# Patient Record
Sex: FEMALE | Race: OTHER | URBAN - METROPOLITAN AREA
[De-identification: names, ages, dates, MRNs, and addresses within clinical notes are randomized per-mention and may not be internally consistent; named-entity substitution may affect disease eponyms.]

---

## 2018-05-07 ENCOUNTER — HOSPITAL ENCOUNTER (OUTPATIENT)
Dept: LAB | Age: 28
Discharge: HOME | End: 2018-05-07

## 2018-05-07 ENCOUNTER — OFFICE VISIT (OUTPATIENT)
Dept: FAMILY MEDICINE CLINIC | Age: 28
End: 2018-05-07

## 2018-05-07 VITALS
HEIGHT: 60 IN | WEIGHT: 121 LBS | SYSTOLIC BLOOD PRESSURE: 105 MMHG | BODY MASS INDEX: 23.75 KG/M2 | TEMPERATURE: 97.7 F | HEART RATE: 73 BPM | DIASTOLIC BLOOD PRESSURE: 45 MMHG

## 2018-05-07 DIAGNOSIS — N30.90 CYSTITIS: ICD-10-CM

## 2018-05-07 DIAGNOSIS — Z13.9 ENCOUNTER FOR SCREENING: ICD-10-CM

## 2018-05-07 DIAGNOSIS — N30.90 CYSTITIS: Primary | ICD-10-CM

## 2018-05-07 LAB
BILIRUB UR QL STRIP: NORMAL
GLUCOSE POC: NORMAL MG/DL
GLUCOSE UR-MCNC: NEGATIVE MG/DL
HCG URINE, QL. (POC): NEGATIVE
HGB BLD-MCNC: 12.3 G/DL
KETONES P FAST UR STRIP-MCNC: NORMAL MG/DL
PH UR STRIP: 5 [PH] (ref 4.6–8)
PROT UR QL STRIP: NORMAL
SP GR UR STRIP: 1.02 (ref 1–1.03)
UA UROBILINOGEN AMB POC: NORMAL (ref 0.2–1)
URINALYSIS CLARITY POC: NORMAL
URINALYSIS COLOR POC: NORMAL
URINE BLOOD POC: NORMAL
URINE LEUKOCYTES POC: NORMAL
URINE NITRITES POC: NEGATIVE
VALID INTERNAL CONTROL?: YES

## 2018-05-07 PROCEDURE — 87491 CHLMYD TRACH DNA AMP PROBE: CPT | Performed by: NURSE PRACTITIONER

## 2018-05-07 PROCEDURE — 87147 CULTURE TYPE IMMUNOLOGIC: CPT | Performed by: NURSE PRACTITIONER

## 2018-05-07 PROCEDURE — 87086 URINE CULTURE/COLONY COUNT: CPT | Performed by: NURSE PRACTITIONER

## 2018-05-07 RX ORDER — CEPHALEXIN 500 MG/1
500 CAPSULE ORAL 2 TIMES DAILY
Qty: 14 CAP | Refills: 0 | Status: SHIPPED | OUTPATIENT
Start: 2018-05-07 | End: 2018-05-14

## 2018-05-07 NOTE — PROGRESS NOTES
Subjective:     Chief Complaint   Patient presents with    Urinary Burning     x4 months +back pain        She  is a 25 y.o. female who presents for evaluation of dysuria. Onset approx 3-4 days ago. S&S is constant, noting burning occurs post urination w/ scant. Notes some L sided flank pain, darker yellow urine w/ a mild malodor. No attempted remedies, except OTC analgesics (APAP). PMH: denies     Surg:   x 2011. NKDA     Current Rx/supplements: denies,OTC as noted above. Social:  Pt denies tobacco, etoh nor drug use. Pt is not currently working. Son was born in . Pt lives w/ her partner, dtr and son. Pt is breastfeeding son. Family Hx: Denies                    ROS  Gen - no fever/chills  Resp - no dyspnea or cough  CV - no chest pain or MCDONOUGH  Rest per HPI    No past medical history on file. No past surgical history on file. No current outpatient prescriptions on file prior to visit. No current facility-administered medications on file prior to visit.          Objective:     Vitals:    18 0923   BP: 105/45   Pulse: 73   Temp: 97.7 °F (36.5 °C)   TempSrc: Oral   Weight: 121 lb (54.9 kg)   Height: 5' 0.43\" (1.535 m)       Physical Examination:  General appearance - alert, well appearing, and in no distress  Eyes -sclera anicteric  Neck - supple, no significant adenopathy, no thyromegaly  Chest - clear to auscultation, no wheezes, rales or rhonchi, symmetric air entry  Heart - normal rate, regular rhythm, normal S1, S2, no murmurs, rubs, clicks or gallops  Neurological - alert, oriented, no focal findings or movement disorder noted  Abdomen-BS present/WNL x 4 quads, non-tender/distended, soft,no organomegaly    Recent Results (from the past 12 hour(s))   AMB POC GLUCOSE BLOOD, BY GLUCOSE MONITORING DEVICE    Collection Time: 18  9:31 AM   Result Value Ref Range    Glucose POC nf 105 mg/dL   AMB POC HEMOGLOBIN (HGB)    Collection Time: 18 9:31 AM   Result Value Ref Range    Hemoglobin (POC) 12.3    AMB POC URINALYSIS DIP STICK MANUAL W/O MICRO    Collection Time: 05/07/18  9:38 AM   Result Value Ref Range    Color (UA POC) Dark Yellow     Clarity (UA POC) Turbid     Glucose (UA POC) Negative Negative    Bilirubin (UA POC) 1+ Negative    Ketones (UA POC) Trace Negative    Specific gravity (UA POC) 1.020 1.001 - 1.035    Blood (UA POC) 3+ Negative    pH (UA POC) 5.0 4.6 - 8.0    Protein (UA POC) 1+ Negative    Urobilinogen (UA POC) 0.2 mg/dL 0.2 - 1    Nitrites (UA POC) Negative Negative    Leukocyte esterase (UA POC) 2+ Negative   AMB POC URINE PREGNANCY TEST, VISUAL COLOR COMPARISON    Collection Time: 05/07/18  9:39 AM   Result Value Ref Range    VALID INTERNAL CONTROL POC Yes     HCG urine, Ql. (POC) Negative Negative         Assessment/ Plan:   Diagnoses and all orders for this visit:    1. Cystitis  -     CULTURE, URINE; Future  -     CHLAMYDIA/GC PCR; Future  -     cephALEXin (KEFLEX) 500 mg capsule; Take 1 Cap by mouth two (2) times a day for 7 days. [place] 1 capsula dos veces al zee por 7 dan. 2. Encounter for screening  -     AMB POC GLUCOSE BLOOD, BY GLUCOSE MONITORING DEVICE  -     AMB POC HEMOGLOBIN (HGB)  -     AMB POC URINE PREGNANCY TEST, VISUAL COLOR COMPARISON  -     AMB POC URINALYSIS DIP STICK MANUAL W/O MICRO        Discussed option of waiting for Cx report vs pre-emptive Tx, Pt opted for Tx given discomfort. Confirmed w/ Pt Abx was well tolerated r/t breastfeeding, and that monitoring her son for allergic S&S/diarrhea was needed. Check urine Cx and g/c. Change Abx PRN based on urine C&S. Cont OTC APAP for discomfort. I have discussed the diagnosis with the patient and the intended plan as seen in the above orders. The patient has received an after-visit summary and questions were answered concerning future plans. I have discussed medication side effects and warnings with the patient as well.   The patient verbalizes understanding and agreement with the plan.     Follow-up Disposition: Not on File

## 2018-05-07 NOTE — MR AVS SNAPSHOT
[address] [place] [address] [address] Nicole Singh 13 
[phone] Patient: Kai Braden MRN: [id number] :10/23/1999 Visit Information Miley Donato Personal Médico Departamento Teléfono del Dep. Número de visita 2018  9:45 AM Kerri Back NP [facility] [address] [facility] [phone] Follow-up Instructions Return if symptoms worsen or fail to improve. Upcoming Health Maintenance Date Due Hepatitis B Peds Age 0-18 (1 of 3 - Primary Series) 10/23/1999 Hepatitis A Peds Age 1-18 (1 of 2 - Standard Series) 10/23/2000 MMR Peds Age 1-18 (1 of 2) 10/23/2000 DTaP/Tdap/Td series (1 - Tdap) 10/23/2006 HPV Age 9Y-34Y (1 of 3 - Female 3 Dose Series) 10/23/2010 Varicella Peds Age 1-18 (1 of 2 - 2 Dose Adolescent Series) 10/23/2012 MCV through Age 25 (1 of 1) 10/23/2015 Influenza Age 5 to Adult 2018 Alergias  Review Complete El: 2018 Por: Kerri Back NP  
 Rik Ra del:  2018 No Known Allergies Vacunas actuales Arland Jobs No hay ninguna vacuna archivada. No revisadas esta visita You Were Diagnosed With   
  
 [place] Perish Cystitis    -  Primary ICD-10-CM: N30.90 ICD-9-CM: 595.9 Encounter for screening     ICD-10-CM: Z13.9 ICD-9-CM: V82.9 Partes vitales PS Pulso Temperatura [place] ( percentil de crecimiento) Peso (percentil de crecimiento) 105/45 (38 %/ 4 %)* (BP 1 Location: Right arm) 73 97.7 °F (36.5 °C) (Oral) 5' 0.43\" (1.535 m) (7 %, Z= -1.50) 121 lb (54.9 kg) (41 %, Z= -0.22) LMP (última parvez) BMI ([facility]) Estado obstétrico Estatus de tabaquísmo 2018 23.29 kg/m2 (70 %, Z= 0.51) Injection Never Smoker *BP percentiles are based on NHBPEP's 4th Report Growth percentiles are based on CDC 2-20 Years data. Historial de signos vitales BMI and BSA Data Body Mass Index Body Surface Area  
 23.29 kg/m 2 1.53 m 2 [facility] Name Phone [address] [phone] Garsia lista de medicamentos actualizada Lista actualizada 5/7/18 11:03 AM.  Yesi Lied use garsia lista de medicamentos más reciente. cephALEXin 500 mg capsule También conocido renetta:  Geri Prima Take 1 Cap by mouth two (2) times a day for 7 days. [place] 1 capsula dos veces al zee por 7 dan. Recetas Enviado a la [place] Refills  
 cephALEXin (KEFLEX) 500 mg capsule 0 Sig: Take 1 Cap by mouth two (2) times a day for 7 days. [place] 1 capsula dos veces al zee por 7 dan. Class: Normal  
 Pharmacy: [facility] DR KATTY MCKEON [facility] 36, [address] #: [phone] Route: Oral  
  
Hicimos lo siguiente AMB POC GLUCOSE BLOOD, BY GLUCOSE MONITORING DEVICE [64916 CPT(R)] AMB POC HEMOGLOBIN (HGB) [14947 CPT(R)] AMB POC URINALYSIS DIP STICK MANUAL W/O MICRO [85858 CPT(R)] AMB POC URINE PREGNANCY TEST, VISUAL COLOR COMPARISON [50570 CPT(R)] Instrucciones de seguimiento Return if symptoms worsen or fail to improve. Por hacer 05/07/2018 Lab:  CHLAMYDIA/GC PCR   
  
 05/07/2018 Microbiology:  CULTURE, URINE Instrucciones para el Paciente Infección urinaria en las mujeres: Instrucciones de cuidado - [ Urinary Tract Infection in Women: Care Instructions ] Instrucciones de cuidado Cesar infección urinaria (UTI, por roxane siglas en inglés) es un término general que hace referencia a cesar infección que se produce en cualquier parte entre los riñones y la uretra (conducto por el cual se expulsa la orina). La mayoría de las UTI son infecciones de la vejiga. Con frecuencia, causan dolor o ardor al [facility]. Las UTI son causadas por bacterias y pueden curarse con antibióticos. Asegúrese de completar el tratamiento para que la infección desaparezca. La atención de seguimiento es cesar parte clave de serra tratamiento y seguridad. Asegúrese de hacer y acudir a todas las citas, y llame a serra médico si está teniendo problemas. También es cesar buena idea saber los resultados de los exámenes y mantener cesar lista de los medicamentos que jesus. Cómo puede cuidarse en el hogar? · [address] No deje de tomarlos por el hecho de sentirse mejor. Debe aiden todos los antibióticos hasta terminarlos. · Yasmeen los próximos sandy o [address], summer mayor cantidad de [place] y otros líquidos. [place] puede ayudar a eliminar las bacterias que provocan la infección. (Si tiene cesar enfermedad de los riñones, el corazón o el hígado y tiene que [place]'s líquidos, hable con serra médico antes de aumentar serra consumo). · Evite las bebidas gaseosas o con cafeína. Pueden irritar la vejiga. · Orine con frecuencia. Trate de vaciar la vejiga cada vez que orine. · Para aliviar el dolor, tome un baño caliente o colóquese cesar almohadilla térmica a baja temperatura sobre la parte baja del abdomen o la jan genital. Nunca se duerma mientras [place] cesar almohadilla térmica. [address] infecciones urinarias · Summer abundante agua todos los días. [place] la ayuda a orinar con frecuencia, lo que elimina las bacterias de serra organismo. (Si tiene cesar enfermedad de los riñones, el corazón o el hígado y tiene que Aminah's líquidos, hable con serra médico antes de aumentar serra consumo). · Orine cuando necesite hacerlo. · Orine inmediatamente después de althea tenido [employer]. · Cámbiese las toallas sanitarias con frecuencia. · Evite el uso de lavados vaginales, los abiodun de burbujas, los [place]ter[place] de higiene femenina y otros productos para la higiene femenina que contengan desodorantes. · Después de ir al baño, límpiese de adelante hacia atrás. Cuándo debes pedir ayuda? Llama a tu médico ahora mismo o busca atención médica inmediata si: 
? · Aparecen síntomas renetta fiebre, escalofríos, náuseas o vómitos por Ck Proffer, o empeoran. ? · Te empieza a doler la espalda, samnia debajo de la caja torácica. A esto se le llama dolor en el flanco.  
? · Aparece kolby o pus en la orina. ? · Tienes problemas con los antibióticos. ?Presta especial atención a los cambios en tu steph y asegúrate de comunicarte con tu médico si: 
? · No mejoras después de althea tomado un antibiótico tony 2 días. ? · Los síntomas desaparecen y Della Blair. Dónde puede encontrar más información en inglés? Melissa Marcoa a [email or web address]-sharda.[email or web address] Becca Pleitez B605 en la búsqueda para aprender más acerca de \"Infección urinaria en las mujeres: Instrucciones de cuidado - [ Urinary Tract Infection in Women: Care Instructions ]. \" 
Revisado: 12 duncan, 2017 Versión del contenido: 11.4 © 6861-9234 Healthwise, Incorporated. Las instrucciones de cuidado fueron adaptadas bajo licencia por [employer] (which disclaims liability or warranty for this information). Si usted tiene [place] afección médica o sobre estas instrucciones, siempre pregunte a serra profesional de steph. Healthwise, Incorporated niega toda garantía o responsabilidad por serra uso de esta información. Introducing [facility]! [facility] introduce portal paciente Georginahart . Ahora se puede acceder a partes de serra expediente médico, enviar por correo electrónico la oficina de serra médico y solicitar renovaciones de medicamentos en línea. En serra navegador de Internet , Ariel Beards a [email or web address] Khang clic en el usuario por Ck Conklin? Leveda Juanito clic aquí en la sesión Deborah Corrigan. Verá la página de registro [place]. Ingrese serra código de [facility] jaky y renetta aparece a continuación.  Usted no tendrá que [employer] código después de althea completado el proceso de registro . Si usted no se inscribe antes de la fecha de caducidad , debe solicitar un nuevo código. · MyChart Código de acceso : [id number] Expires: 8/5/2018 11:02 AM 
 
Ingresa los últimos cuatro dígitos de serra Número de Seguro Social ( xxxx ) y fecha de nacimiento ( dd / mm / aaaa ) renetta se indica y khang clic en Enviar. Usted será llevado a la siguiente página de registro . Crear un ID MyChart . Esta será serra ID de inicio de sesión de MyChart y no puede ser [place] , por lo que pensar en cesar que es Madonna Arsh y fácil de recordar . Crear cesar contraseña MyChart . Usted puede cambiar serra contraseña en cualquier momento . Ingrese serra Password Reset de preguntas y [place] . [place] se puede utilizar en un momento posterior si usted olvida serra contraseña. Introduzca serra dirección de correo electrónico . Roger Commander recibirá cesar notificación por correo electrónico cuando la nueva información está disponible en MyChart . Lucrezia Lager clic en Registrarse. Hiral [place] candelario y descargar porciones de serra expediente médico. 
Khang clic en el enlace de descarga del menú Resumen para descargar cesar copia portátil de serra información médica . Si tiene [employer] , por favor visite la sección de preguntas frecuentes del sitio web MyChart . Recuerde, MyChart NO es que se utilizará para las necesidades urgentes. Para emergencias médicas , llame al 911 . Ahora disponible en serra iPhone y Android ! Por favor proporcione tonia resumen de la documentación de cuidado a serra próximo proveedor. If you have any questions after today's visit, please call [phone].

## 2018-05-07 NOTE — PATIENT INSTRUCTIONS
Infección urinaria en las mujeres: Instrucciones de cuidado - [ Urinary Tract Infection in Women: Care Instructions ]  Instrucciones de cuidado    Cesar infección urinaria (UTI, por roxane siglas en inglés) es un término general que hace referencia a cesar infección que se produce en cualquier parte entre los riñones y la uretra (conducto por el cual se expulsa la orina). La mayoría de las UTI son infecciones de la vejiga. Con frecuencia, causan dolor o ardor al [place]ty. Las UTI son causadas por bacterias y pueden curarse con antibióticos. Asegúrese de completar el tratamiento para que la infección desaparezca. La atención de seguimiento es cesar parte clave de serra tratamiento y seguridad. Asegúrese de hacer y acudir a todas las citas, y llame a serra médico si está teniendo problemas. También es cesar buena idea saber los resultados de los exámenes y mantener cesar lista de los medicamentos que jesus. ¿Cómo puede cuidarse en el hogar? · [address]. No deje de tomarlos por el hecho de sentirse mejor. Debe aiden todos los antibióticos hasta terminarlos. · Yasme[place] los próximos sandy o [address], summer mayor cantidad de [place] y otros líquidos. [place] puede ayudar a eliminar las bacterias que provocan la infección. (Si tiene cesar enfermedad de los riñones, el corazón o el hígado y tiene que [place]'s líquidos, hable con serra médico antes de aumentar serra consumo). · Evite las bebidas gaseosas o con cafeína. Pueden irritar la vejiga. · Orine con frecuencia. Trate de vaciar la vejiga cada vez que orine. · Para aliviar el dolor, tome un baño caliente o colóquese cesar almohadilla térmica a baja temperatura sobre la parte baja del abdomen o la jan genital. Nunca se duerma mientras [place] cesar almohadilla térmica. Para prevenir las infecciones urinarias  · Summer abundante agua todos los días. [place] la ayuda a orinar con frecuencia, lo que elimina las bacterias de serra organismo.  (Si tiene [employer] riñones, el corazón o el hígado y tiene que Aminah's líquidos, hable con serra médico antes de aumentar serra consumo). · Orine cuando necesite hacerlo. · Orine inmediatamente después de althea tenido [employer]. · Cámbiese las toallas sanitarias con frecuencia. · Evite el uso de lavados vaginales, los abiodun de burbujas, los [place]ter[place] de higiene femenina y otros productos para la higiene femenina que contengan desodorantes. · Después de ir al baño, límpiese de adelante hacia atrás. ¿Cuándo debes pedir ayuda? Llama a tu médico ahora mismo o busca atención médica inmediata si:  ? · Aparecen síntomas renetta fiebre, escalofríos, náuseas o vómitos por [place], o empeoran. ? · Te empieza a doler la espalda, samina debajo de la caja torácica. A esto se le llama dolor en el flanco.   ? · Aparece kolby o pus en la orina. ? · Tienes problemas con los antibióticos. ?Presta especial atención a los cambios en tu steph y asegúrate de comunicarte con tu médico si:  ? · No mejoras después de althea tomado un antibiótico tony 2 días. ? · Los síntomas desaparecen y [place]. ¿Dónde puede encontrar más información en inglés? Bruno Sinks a http://kayce-sharda.info/. Amna Montalvo K966 en la búsqueda para aprender más acerca de \"Infección urinaria en las mujeres: Instrucciones de cuidado - [ Urinary Tract Infection in Women: Care Instructions ]. \"  Revisado: 12 [place]sgood, 2017  Versión del contenido: 11.4  © 7809-8350 Healthwise, Incorporated. Las instrucciones de cuidado fueron adaptadas bajo licencia por [employer] Help Connections (which disclaims liability or warranty for this information). Si usted tiene [place] afección médica o sobre estas instrucciones, siempre pregunte a serra profesional de steph. [facility], Incorporated niega toda garantía o responsabilidad por serra uso de esta información.

## 2018-05-07 NOTE — PROGRESS NOTES
AVS printed and reviewed. E script discussed. No Good Rx is required as cost for 14 tabs of Keflex is approx $4.   Discussion assisted by [facility] [place] [place] , Devin Guajardo.

## 2018-05-08 LAB
BACTERIA SPEC CULT: ABNORMAL
BACTERIA SPEC CULT: ABNORMAL
CC UR VC: ABNORMAL
SERVICE CMNT-IMP: ABNORMAL

## 2018-05-09 LAB
C TRACH DNA SPEC QL NAA+PROBE: NEGATIVE
N GONORRHOEA DNA SPEC QL NAA+PROBE: NEGATIVE
SAMPLE TYPE: NORMAL
SERVICE CMNT-IMP: NORMAL
SPECIMEN SOURCE: NORMAL

## 2022-07-21 ENCOUNTER — HOSPITAL ENCOUNTER (OUTPATIENT)
Dept: LAB | Age: 32
Discharge: HOME | End: 2022-07-21

## 2022-07-21 ENCOUNTER — OFFICE VISIT (OUTPATIENT)
Dept: FAMILY MEDICINE CLINIC | Age: 32
End: 2022-07-21

## 2022-07-21 VITALS
OXYGEN SATURATION: 95 % | HEIGHT: 61 IN | HEART RATE: 66 BPM | SYSTOLIC BLOOD PRESSURE: 113 MMHG | DIASTOLIC BLOOD PRESSURE: 66 MMHG | BODY MASS INDEX: 27.56 KG/M2 | WEIGHT: 146 LBS | TEMPERATURE: 98.1 F

## 2022-07-21 DIAGNOSIS — M54.2 NECK PAIN: ICD-10-CM

## 2022-07-21 DIAGNOSIS — R22.1 NECK SWELLING: ICD-10-CM

## 2022-07-21 DIAGNOSIS — R22.1 NECK SWELLING: Primary | ICD-10-CM

## 2022-07-21 LAB
ALBUMIN SERPL-MCNC: 4.1 G/DL (ref 3.5–5)
ALBUMIN/GLOB SERPL: 1.2 {RATIO} (ref 1.1–2.2)
ALP SERPL-CCNC: 74 U/L (ref 45–117)
ALT SERPL-CCNC: 18 U/L (ref 12–78)
ANION GAP SERPL CALC-SCNC: 7 MMOL/L (ref 5–15)
AST SERPL-CCNC: 12 U/L (ref 15–37)
BILIRUB SERPL-MCNC: 0.3 MG/DL (ref 0.2–1)
BUN SERPL-MCNC: 8 MG/DL (ref 6–20)
BUN/CREAT SERPL: 13 (ref 12–20)
CALCIUM SERPL-MCNC: 9 MG/DL (ref 8.5–10.1)
CHLORIDE SERPL-SCNC: 108 MMOL/L (ref 97–108)
CO2 SERPL-SCNC: 24 MMOL/L (ref 21–32)
CREAT SERPL-MCNC: 0.64 MG/DL (ref 0.55–1.02)
ERYTHROCYTE [DISTWIDTH] IN BLOOD BY AUTOMATED COUNT: 13.9 % (ref 11.5–14.5)
GLOBULIN SER CALC-MCNC: 3.3 G/DL (ref 2–4)
GLUCOSE SERPL-MCNC: 90 MG/DL (ref 65–100)
HCT VFR BLD AUTO: 41.9 % (ref 35–47)
HGB BLD-MCNC: 13.5 G/DL (ref 11.5–16)
MCH RBC QN AUTO: 29.9 PG (ref 26–34)
MCHC RBC AUTO-ENTMCNC: 32.2 G/DL (ref 30–36.5)
MCV RBC AUTO: 92.9 FL (ref 80–99)
NRBC # BLD: 0 K/UL (ref 0–0.01)
NRBC BLD-RTO: 0 PER 100 WBC
PLATELET # BLD AUTO: 287 K/UL (ref 150–400)
PMV BLD AUTO: 10.9 FL (ref 8.9–12.9)
POTASSIUM SERPL-SCNC: 3.8 MMOL/L (ref 3.5–5.1)
PROT SERPL-MCNC: 7.4 G/DL (ref 6.4–8.2)
RBC # BLD AUTO: 4.51 M/UL (ref 3.8–5.2)
SODIUM SERPL-SCNC: 139 MMOL/L (ref 136–145)
TSH SERPL DL<=0.05 MIU/L-ACNC: 0.66 UIU/ML (ref 0.36–3.74)
WBC # BLD AUTO: 8.7 K/UL (ref 3.6–11)

## 2022-07-21 PROCEDURE — 84443 ASSAY THYROID STIM HORMONE: CPT

## 2022-07-21 PROCEDURE — 82306 VITAMIN D 25 HYDROXY: CPT

## 2022-07-21 PROCEDURE — 99203 OFFICE O/P NEW LOW 30 MIN: CPT | Performed by: FAMILY MEDICINE

## 2022-07-21 PROCEDURE — 85027 COMPLETE CBC AUTOMATED: CPT

## 2022-07-21 PROCEDURE — 80053 COMPREHEN METABOLIC PANEL: CPT

## 2022-07-21 RX ORDER — AMOXICILLIN 500 MG/1
TABLET, FILM COATED ORAL
COMMUNITY
Start: 2022-04-23 | End: 2022-07-21 | Stop reason: ALTCHOICE

## 2022-07-21 RX ORDER — CHLORHEXIDINE GLUCONATE 1.2 MG/ML
RINSE ORAL
COMMUNITY
Start: 2022-04-23 | End: 2022-07-21 | Stop reason: ALTCHOICE

## 2022-07-21 RX ORDER — IBUPROFEN 600 MG/1
TABLET ORAL
COMMUNITY
Start: 2022-04-23 | End: 2022-07-21 | Stop reason: ALTCHOICE

## 2022-07-21 NOTE — PROGRESS NOTES
Coordination of Care  1. Have you been to the ER, urgent care clinic since your last visit? Hospitalized since your last visit? No    2. Have you seen or consulted any other health care providers outside of the [address] [place] [address] since your last visit? Include any pap smears or colon screening. No    Does the patient need refills? NO    Learning Assessment Complete?  yes  Depression Screening complete in the past 12 months? yes

## 2022-07-21 NOTE — PROGRESS NOTES
Verified name and . An AVS was printed and given to the patient. Reviewed all discharge instructions, including: f/up appt. Allowed time for questions and patient verbalized understanding to all given instructions. Patient discharged from clinic in stable condition.

## 2022-07-21 NOTE — PROGRESS NOTES
Ronnie Arreola (: 1990) is a 32 y.o. female, new patient, here for evaluation of the following chief complaint(s):  Neck Swelling (Pt c/o bilateral neck pain with swelling x 1 month) and Annual Wellness Visit       ASSESSMENT/PLAN:  1. Neck swelling  Suggests musculoskeletal, continue with Ibuprofen. Will check labs as patient want general screening labs. -     TSH 3RD GENERATION; Future  -     METABOLIC PANEL, COMPREHENSIVE; Future  -     CBC W/O DIFF; Future  2. Neck pain  -     VITAMIN D, 25 HYDROXY; Future    Follow-up and Dispositions    Return for follow up for VV in 1 week for lab results. .         SUBJECTIVE:  Neck swelling: x 1 month. Now she is having more upper back and neck pain x a few weeks. Ibuprofen helps her sx. Fhx: no DM    Has PAPs regularly. Review of Systems   Constitutional:  Negative for chills, diaphoresis and fever. HENT:  Negative for sore throat and trouble swallowing. Respiratory:  Negative for cough and shortness of breath. Musculoskeletal:  Positive for neck pain and neck stiffness. Negative for arthralgias and joint swelling. Psychiatric/Behavioral:  Negative for dysphoric mood. The patient is not nervous/anxious. Some weight gain. NL menses. OBJECTIVE:  Blood pressure 113/66, pulse 66, temperature 98.1 °F (36.7 °C), temperature source Temporal, height 5' 0.63\" (1.54 m), weight 146 lb (66.2 kg), last menstrual period 2022, SpO2 95 %. Physical Exam  CONSTITUTIONAL:  Well developed. No apparent distress. PSYCHIATRIC: Oriented to time, place, person & situation. Appropriate mood and affect. NECK:  Normal inspection, normal palpation without any lymphadenopathy, masses, or thyromegaly. Area of concern is B supraclavicular area but no pain or masses felt. Tender trapezius B along neck, lateral and paraspinal.  CARDIOVASCULAR:  Regular rate and rhythm. Normal S1, S2. No extra sounds. RESPIRATORY:  Normal effort.   Normal ascultation without wheezing. EXTREMITIES:  No edema. No results found for this visit on 07/21/22. An electronic signature was used to authenticate this note.   -- Raghu Awan MD

## 2022-07-22 LAB — 25(OH)D3 SERPL-MCNC: 29.7 NG/ML (ref 30–100)

## 2022-08-01 ENCOUNTER — VIRTUAL VISIT (OUTPATIENT)
Dept: FAMILY MEDICINE CLINIC | Age: 32
End: 2022-08-01

## 2022-08-01 DIAGNOSIS — M54.2 NECK PAIN: Primary | ICD-10-CM

## 2022-08-01 DIAGNOSIS — E55.9 VITAMIN D DEFICIENCY: ICD-10-CM

## 2022-08-01 PROCEDURE — 99441 PR PHYS/QHP TELEPHONE EVALUATION 5-10 MIN: CPT | Performed by: FAMILY MEDICINE

## 2022-08-01 NOTE — PROGRESS NOTES
Angie Ho (: 1990) is a 32 y.o. female, established patient, Virtual Visit for evaluation of the following chief complaint(s):   Results (Labs )       ASSESSMENT/PLAN:  1. Neck pain  Pain is controlled with Ibuprofen. Labs are normal.  For swelling, I suspect it is secondary to muscle tension. Use Ibuprofen once daily x 2 weeks. Reviewed sx that would require additional review including fevers, chills, worsening pain, palpitations, weakness. 2. Vitamin D deficiency  Start OTC Vitamin D3 1,000 units daily. No follow-ups on file. SUBJECTIVE/OBJECTIVE:  HPI VV for lab follow up. Neck pain/swelling:  x 1 month. Relieved with Ibuprofen. Review of Systems     Patient-Reported LMP: 2022    Physical Exam    On this date 2022 I have spent 8 minutes reviewing previous notes, test results and face to face (virtual) with the patient discussing the diagnosis and importance of compliance with the treatment plan as well as documenting on the day of the visit. Angie Ho, was evaluated through a synchronous (real-time) audio-video encounter. The patient (or guardian if applicable) is aware that this is a billable service, which includes applicable co-pays. This Virtual Visit was conducted with patient's (and/or legal guardian's) consent. The visit was conducted pursuant to the emergency declaration under the [address] [place] [address], [address] [place] [address] [facility] authority and the [employer] and [employer]ues[employer]comar General Act. Patient identification was verified, and a caregiver was present when appropriate. The patient was located at: Home: [address] [place] [address]  The provider was located at: Home: [unfilled]     Patient identification was verified at the start of the visit: YES    Services were provided through a phone synchronous discussion virtually to substitute for in-person clinic visit.  Patient was located at home and provider was located in office or at home. An electronic signature was used to authenticate this note.   -- Chidi Gibbons MD

## 2022-08-01 NOTE — PROGRESS NOTES
Tc to the pt with  Dg Durbin. She verified her name and . No vs equipment was available today. Coordination of Care  1. Have you been to the ER, urgent care clinic since your last visit? Hospitalized since your last visit? No    2. Have you seen or consulted any other health care providers outside of the [address] [place] [address] since your last visit? Include any pap smears or colon screening. No    Does the patient need refills?  N/A    Learning Assessment Complete? no  Depression Screening complete in the past 12 months? yes